# Patient Record
Sex: MALE | Race: WHITE
[De-identification: names, ages, dates, MRNs, and addresses within clinical notes are randomized per-mention and may not be internally consistent; named-entity substitution may affect disease eponyms.]

---

## 2018-10-30 ENCOUNTER — HOSPITAL ENCOUNTER (OUTPATIENT)
Dept: HOSPITAL 61 - ECHO | Age: 73
Discharge: HOME | End: 2018-10-30
Attending: INTERNAL MEDICINE
Payer: MEDICARE

## 2018-10-30 DIAGNOSIS — R53.83: ICD-10-CM

## 2018-10-30 DIAGNOSIS — R07.9: Primary | ICD-10-CM

## 2018-10-30 PROCEDURE — 93017 CV STRESS TEST TRACING ONLY: CPT

## 2018-10-30 PROCEDURE — 93350 STRESS TTE ONLY: CPT

## 2018-10-30 NOTE — CARD
MR#: [id number]

Account#: [id number]

Accession#: [id number]

Date of Study: 10/30/2018

Ordering Physician: GELA ALFONSO, 

Referring Physician: GELA ALFONSO, 

Tech: Jennie Preston CS





--------------- APPROVED REPORT --------------





INDICATION

Chest Pain 



Reason : Patient complained of pain



PROCEDURE

The patient underwent an Exercise Stress Test using the Daniel Protocol. Blood pressure, heart rate, a
nd EKG were monitored.

An Echocardiogram was performed by technician in four stages in quad fashion.  At peak stress four se
lected images were obtained and placed side by side with resting images for comparison.



STRESS ECHO FINDINGS

The resting Echocardiogram showed normal left ventricular systolic contractility with an estimated Ej
ection Fraction of about 60 %. 

The Resting Echocardiogram showed normal augmentation of myocardial wall segments using a 16 segment 
model.

The Stress Echocardiogram showed normal augmentation of myocardial wall segments using a 16 segment m
patricia.

The Stress Echocardiogram left ventricular systolic contractility has an estimated Ejection Fraction 
of about 70%. 



Test Type:          Exercise

Stress Nurse/Tech: Ruth Browne RN

Test Indications: Chest pain

Cardiac History and Allergies: See [employer] EMR NKDA

Medications:     See EMR

Medical History: See EMR

Resting ECG:     SR

Resting Heart Rate: 63 bpm

Resting Blood Pressure: 150/74mmHg

Pretest Chest Pain: No chest pain



Nurse/Tech Notes

S1,S2 and lungs are clear to auscultation.



Stress Symptoms

Fatigue



POST EXERCISE

Reason for Termination: Reached target heart rate, Fatigue

Target HR: No

Max HR: 147 bpm

99% of Maximum Predicted HR: 148 bpm

Exercise duration: 10:31 min:sec, 4 Stage

Exercise capacity: 12.8METs

Max Blood Pressure: 188/54mmHg

Blood Pressure response to exercise: Normal blood pressure response during stress.

Heart Rate response to exercise: WNL

Chest Pain: No. 

Arrhythmia: No. 

ST Change: Yes. 



INTERPRETATION

Stress EKG Conclusion: Negative for ischemia. 



STRESS ECG

Stress EKG shows no significant changes.



Preliminary Notification

Critical Value: No



<Conclusion>

No evidence of stress induced EKG changes. Normal exercise capacity with 12.8 Mets achieved

Normal rest and stress wall motion and EF. 

Low risk study



Signed by : Gela Alfonso, 

Electronically Approved : 10/30/2018 15:08:09

## 2018-11-27 ENCOUNTER — HOSPITAL ENCOUNTER (OUTPATIENT)
Dept: HOSPITAL 61 - KCIC CT | Age: 73
Discharge: HOME | End: 2018-11-27
Attending: INTERNAL MEDICINE
Payer: SELF-PAY

## 2018-11-27 DIAGNOSIS — E78.5: Primary | ICD-10-CM

## 2018-11-27 DIAGNOSIS — R91.1: ICD-10-CM

## 2018-11-27 PROCEDURE — 75571 CT HRT W/O DYE W/CA TEST: CPT

## 2018-11-27 NOTE — [facility]
PQRS Compliance Statement:

 

One or more of the following individualized dose reduction techniques were

utilized for this examination:  

1. Automated exposure control  

2. Adjustment of the mA and/or kV according to patient size  

3. Use of iterative reconstruction technique

 

 

Coronary calcium score CT chest without contrast

 

History: 73-year-old male with dyslipidemia for calcium scoring. History 

of right nephrectomy for malignancy.

 

Technique: With retrospective electrocardiogram gating 2.5 mm thick axial 

reconstructed noncontrast images of the chest at the level of the coronary

arteries was performed. Images were post processed on a [employer] workstation and

calcium score calculated using the modified Agatston Janowitz protocol.

 

Findings: Total coronary calcium score is 67.4. This is a mild plaque 

burden and low cardiovascular disease risk. This is based on the calcium 

score of 0 of the left main coronary artery, 51.1 of the left anterior 

descending artery, score of 0.3 of the left circumflex artery and score of

16 of the right coronary artery.

 

Noncoronary findings demonstrate normal cardiac size. No pericardial 

effusion. Visualized upper abdomen is unremarkable. 4 mm nodule in the 

right lung is along the minor fissure and may be an intrapulmonary lymph 

node. There is minimal dependent atelectasis or scarring in the bilateral 

lower lobes. Visualized lungs otherwise clear.

 

IMPRESSION:

1.  Patient's total calcium score is 67.4.

2.  There is a 4 mm noncalcified nodule in the right middle lobe. Given 

history of malignancy, recommend comparison to prior imaging. If not 

available recommend noncontrast CT chest follow-up in 6-12 months.

 

Electronically signed by: Rj Maloney MD (11/27/2018 9:30 AM) [id number]